# Patient Record
Sex: MALE | Race: BLACK OR AFRICAN AMERICAN | NOT HISPANIC OR LATINO | Employment: FULL TIME | URBAN - METROPOLITAN AREA
[De-identification: names, ages, dates, MRNs, and addresses within clinical notes are randomized per-mention and may not be internally consistent; named-entity substitution may affect disease eponyms.]

---

## 2017-01-06 ENCOUNTER — CLINICAL SUPPORT (OUTPATIENT)
Dept: ALLERGY | Facility: CLINIC | Age: 32
End: 2017-01-06
Payer: COMMERCIAL

## 2017-01-06 DIAGNOSIS — J30.89 ALLERGIC RHINITIS DUE TO OTHER ALLERGEN: ICD-10-CM

## 2017-01-06 DIAGNOSIS — J30.1 SEASONAL ALLERGIC RHINITIS DUE TO POLLEN: ICD-10-CM

## 2017-01-06 PROCEDURE — 95117 IMMUNOTHERAPY INJECTIONS: CPT | Mod: S$GLB,,, | Performed by: ALLERGY & IMMUNOLOGY

## 2017-01-06 PROCEDURE — 99999 PR PBB SHADOW E&M-EST. PATIENT-LVL I: CPT | Mod: PBBFAC,,,

## 2017-01-06 NOTE — PROGRESS NOTES
Patient received allergy injection today at  10:17 a.m., M 1:100,000 dilution 0.2ml. given sub-q left arm.  Rx# [id number] mixed by [employer]  Expires 11/30/17  Patient checked after 30 minute wait.  Local reaction:0    Patient received allergy injection today at  10:17 a.m., TR/GR 1:100,000 dilution 0.2ml. given sub-q left arm.  Rx# [id number] mixed by [employer]  Expires 11/30/17  Patient checked after 30 minute wait.  Local reaction:0     Patient received allergy injection today at  10:17 a.m., C/CR/DM 1:100,000 dilution 0.2ml. given sub-q right arm.  Rx# [id number] mixed by [employer]  Expires 11/30/17  Patient checked after 30 minute wait.  Local reaction:0

## 2017-01-27 ENCOUNTER — CLINICAL SUPPORT (OUTPATIENT)
Dept: ALLERGY | Facility: CLINIC | Age: 32
End: 2017-01-27
Payer: COMMERCIAL

## 2017-01-27 DIAGNOSIS — J30.89 ALLERGIC RHINITIS DUE TO OTHER ALLERGEN: ICD-10-CM

## 2017-01-27 DIAGNOSIS — J30.1 SEASONAL ALLERGIC RHINITIS DUE TO POLLEN: ICD-10-CM

## 2017-01-27 PROCEDURE — 95117 IMMUNOTHERAPY INJECTIONS: CPT | Mod: S$GLB,,, | Performed by: ALLERGY & IMMUNOLOGY

## 2017-01-27 PROCEDURE — 99999 PR PBB SHADOW E&M-EST. PATIENT-LVL I: CPT | Mod: PBBFAC,,,

## 2017-01-27 NOTE — PROGRESS NOTES
Patient received allergy injection today at  10:58 a.m., M 1:100,000 dilution 0.2ml. given sub-q right arm.  Rx# [id number] mixed by [employer]  Expires 11/30/17  Patient checked after 30 minute wait.  Local reaction:0     Patient received allergy injection today at  10:58 a.m., TR/GR 1:100,000 dilution 0.2ml. given sub-q right arm.  Rx# [id number] mixed by [employer]  Expires 11/30/17  Patient checked after 30 minute wait.  Local reaction:0     Patient received allergy injection today at  10:58 a.m., C/CR/DM 1:100,000 dilution 0.2ml. given sub-q left arm.  Rx# [id number] mixed by [employer]  Expires 11/30/17  Patient checked after 30 minute wait.  Local reaction:0

## 2017-02-03 ENCOUNTER — CLINICAL SUPPORT (OUTPATIENT)
Dept: ALLERGY | Facility: CLINIC | Age: 32
End: 2017-02-03
Payer: COMMERCIAL

## 2017-02-03 DIAGNOSIS — J30.1 SEASONAL ALLERGIC RHINITIS DUE TO POLLEN: ICD-10-CM

## 2017-02-03 DIAGNOSIS — J30.89 ALLERGIC RHINITIS DUE TO OTHER ALLERGEN: ICD-10-CM

## 2017-02-03 PROCEDURE — 95117 IMMUNOTHERAPY INJECTIONS: CPT | Mod: S$GLB,,, | Performed by: ALLERGY & IMMUNOLOGY

## 2017-02-03 NOTE — PROGRESS NOTES
Patient received allergy injection today at  9:50 a.m., M 1:100,000 dilution 0.3ml. given sub-q left arm.  Rx# [id number] mixed by [employer]  Expires 11/30/17  Patient checked after 30 minute wait.  Local reaction:0     Patient received allergy injection today at  9:50 a.m., TR/GR 1:100,000 dilution 0.3ml. given sub-q left arm.  Rx# [id number] mixed by [employer]  Expires 11/30/17  Patient checked after 30 minute wait.  Local reaction:0     Patient received allergy injection today at  9:50 a.m., C/CR/DM 1:100,000 dilution 0.3ml. given sub-q right arm.  Rx# [id number] mixed by [employer]  Expires 11/30/17  Patient checked after 30 minute wait.  Local reaction:0

## 2017-02-10 ENCOUNTER — CLINICAL SUPPORT (OUTPATIENT)
Dept: ALLERGY | Facility: CLINIC | Age: 32
End: 2017-02-10
Payer: COMMERCIAL

## 2017-02-10 DIAGNOSIS — J30.1 SEASONAL ALLERGIC RHINITIS DUE TO POLLEN: ICD-10-CM

## 2017-02-10 DIAGNOSIS — J30.89 ALLERGIC RHINITIS DUE TO OTHER ALLERGEN: ICD-10-CM

## 2017-02-10 PROCEDURE — 95117 IMMUNOTHERAPY INJECTIONS: CPT | Mod: S$GLB,,, | Performed by: ALLERGY & IMMUNOLOGY

## 2017-02-10 NOTE — PROGRESS NOTES
Patient received allergy injection today at  10:55 a.m., M 1:100,000 dilution 0.4ml. given sub-q right arm.  Rx# [id number] mixed by [employer]  Expires 11/30/17  Patient checked after 30 minute wait.  Local reaction:0     Patient received allergy injection today at  10:55 a.m., TR/GR 1:100,000 dilution 0.4ml. given sub-q right arm.  Rx# [id number] mixed by [employer]  Expires 11/30/17  Patient checked after 30 minute wait.  Local reaction:0     Patient received allergy injection today at  10:55 a.m., C/CR/DM 1:100,000 dilution 0.4ml. given sub-q left arm.  Rx# [id number] mixed by [employer]  Expires 11/30/17  Patient checked after 30 minute wait.  Local reaction:0

## 2017-02-24 ENCOUNTER — CLINICAL SUPPORT (OUTPATIENT)
Dept: ALLERGY | Facility: CLINIC | Age: 32
End: 2017-02-24
Payer: COMMERCIAL

## 2017-02-24 DIAGNOSIS — J30.89 ALLERGIC RHINITIS DUE TO OTHER ALLERGEN: ICD-10-CM

## 2017-02-24 DIAGNOSIS — J30.1 SEASONAL ALLERGIC RHINITIS DUE TO POLLEN: ICD-10-CM

## 2017-02-24 PROCEDURE — 99999 PR PBB SHADOW E&M-EST. PATIENT-LVL I: CPT | Mod: PBBFAC,,,

## 2017-02-24 PROCEDURE — 95117 IMMUNOTHERAPY INJECTIONS: CPT | Mod: S$GLB,,, | Performed by: ALLERGY & IMMUNOLOGY

## 2017-02-24 NOTE — PROGRESS NOTES
Patient received allergy injection today at  1:07 p.m., M 1:100,000 dilution 0.4ml. given sub-q left arm.  Rx# [id number] mixed by [employer]  Expires 11/30/17  Patient checked after 30 minute wait.  Local reaction:0     Patient received allergy injection today at  1:07 p.m., TR/GR 1:100,000 dilution 0.4ml. given sub-q left arm.  Rx# [id number] mixed by [employer]  Expires 11/30/17  Patient checked after 30 minute wait.  Local reaction:0     Patient received allergy injection today at  1:07 p.m., C/CR/DM 1:100,000 dilution 0.4ml. given sub-q right arm.  Rx# [id number] mixed by [employer]  Expires 11/30/17  Patient checked after 30 minute wait.  Local reaction:0

## 2017-03-10 ENCOUNTER — CLINICAL SUPPORT (OUTPATIENT)
Dept: ALLERGY | Facility: CLINIC | Age: 32
End: 2017-03-10
Payer: COMMERCIAL

## 2017-03-10 DIAGNOSIS — J30.89 ALLERGIC RHINITIS DUE TO OTHER ALLERGEN: ICD-10-CM

## 2017-03-10 DIAGNOSIS — J30.1 ALLERGIC RHINITIS DUE TO POLLEN, UNSPECIFIED RHINITIS SEASONALITY: ICD-10-CM

## 2017-03-10 PROCEDURE — 95117 IMMUNOTHERAPY INJECTIONS: CPT | Mod: S$GLB,,, | Performed by: ALLERGY & IMMUNOLOGY

## 2017-03-10 NOTE — PROGRESS NOTES
Patient received allergy injection today at  9:27 a.m., M 1:100,000 dilution 0.4ml. given sub-q right arm.  Rx# [id number] mixed by [employer]  Expires 11/30/17  Patient checked after 30 minute wait.  Local reaction:0     Patient received allergy injection today at  9:27 a.m., TR/GR 1:100,000 dilution 0.4ml. given sub-q right arm.  Rx# [id number] mixed by [employer]  Expires 11/30/17  Patient checked after 30 minute wait.  Local reaction:0     Patient received allergy injection today at  9:27 a.m., C/CR/DM 1:100,000 dilution 0.4ml. given sub-q left arm.  Rx# [id number] mixed by [employer]  Expires 11/30/17  Patient checked after 30 minute wait.  Local reaction:0

## 2017-03-24 ENCOUNTER — CLINICAL SUPPORT (OUTPATIENT)
Dept: ALLERGY | Facility: CLINIC | Age: 32
End: 2017-03-24
Payer: COMMERCIAL

## 2017-03-24 DIAGNOSIS — J30.89 ALLERGIC RHINITIS DUE TO OTHER ALLERGEN: ICD-10-CM

## 2017-03-24 DIAGNOSIS — J30.1 SEASONAL ALLERGIC RHINITIS DUE TO POLLEN: ICD-10-CM

## 2017-03-24 PROCEDURE — 95117 IMMUNOTHERAPY INJECTIONS: CPT | Mod: S$GLB,,, | Performed by: ALLERGY & IMMUNOLOGY

## 2017-03-24 PROCEDURE — 99999 PR PBB SHADOW E&M-EST. PATIENT-LVL I: CPT | Mod: PBBFAC,,,

## 2017-03-24 NOTE — PROGRESS NOTES
Patient received allergy injection today at  10:16 a.m., M 1:100,000 dilution 0.4ml. given sub-q left arm.  Rx# [id number] mixed by [employer]  Expires 11/30/17  Patient checked after 30 minute wait.  Local reaction:0     Patient received allergy injection today at  10:16 a.m., TR/GR 1:100,000 dilution 0.4ml. given sub-q left arm.  Rx# [id number] mixed by [employer]  Expires 11/30/17  Patient checked after 30 minute wait.  Local reaction:0     Patient received allergy injection today at  10:16 a.m., C/CR/DM 1:100,000 dilution 0.4ml. given sub-q right arm.  Rx# [id number] mixed by [employer]  Expires 11/30/17  Patient checked after 30 minute wait.  Local reaction:0

## 2017-04-21 ENCOUNTER — CLINICAL SUPPORT (OUTPATIENT)
Dept: ALLERGY | Facility: CLINIC | Age: 32
End: 2017-04-21
Payer: COMMERCIAL

## 2017-04-21 DIAGNOSIS — J30.89 ALLERGIC RHINITIS DUE TO OTHER ALLERGEN: ICD-10-CM

## 2017-04-21 DIAGNOSIS — J30.1 SEASONAL ALLERGIC RHINITIS DUE TO POLLEN: ICD-10-CM

## 2017-04-21 PROCEDURE — 95117 IMMUNOTHERAPY INJECTIONS: CPT | Mod: S$GLB,,, | Performed by: ALLERGY & IMMUNOLOGY

## 2017-04-21 NOTE — PROGRESS NOTES
Patient received allergy injection today at  10:52 a.m., M 1:100,000 dilution 0.4ml. given sub-q right arm.  Rx# [id number] mixed by [employer]  Expires 11/30/17  Patient checked after 30 minute wait.  Local reaction:0     Patient received allergy injection today at  10:52 a.m., TR/GR 1:100,000 dilution 0.4ml. given sub-q right arm.  Rx# [id number] mixed by [employer]  Expires 11/30/17  Patient checked after 30 minute wait.  Local reaction:0     Patient received allergy injection today at  10:52 a.m., C/CR/DM 1:100,000 dilution 0.4ml. given sub-q left arm.  Rx# [id number] mixed by [employer]  Expires 11/30/17  Patient checked after 30 minute wait.  Local reaction:0

## 2017-04-28 ENCOUNTER — CLINICAL SUPPORT (OUTPATIENT)
Dept: ALLERGY | Facility: CLINIC | Age: 32
End: 2017-04-28
Payer: COMMERCIAL

## 2017-04-28 DIAGNOSIS — J30.89 ALLERGIC RHINITIS DUE TO OTHER ALLERGEN: ICD-10-CM

## 2017-04-28 PROCEDURE — 95117 IMMUNOTHERAPY INJECTIONS: CPT | Mod: S$GLB,,, | Performed by: ALLERGY & IMMUNOLOGY

## 2017-04-28 NOTE — PROGRESS NOTES
Patient received allergy injection today at 1:10 p.m., M 1:100,000 dilution 0.5ml. given sub-q left arm. Rx# [id number] mixed by [email or web address] Expires 11/30/17 Patient checked after 30 minute wait. Local reaction: 0     Patient received allergy injection today at 1:10 p.m., TR/GR 1:100,000 dilution 0.5ml. given sub-q left arm. Rx# [id number] mixed by [email or web address] Expires 11/30/17 Patient checked after 30 minute wait. Local reaction:0     Patient received allergy injection today at 1:10 p.m., C/CR/DM 1:100,000 dilution 0.5ml. given sub-q right arm. Rx# [id number] mixed by [email or web address] Expires 11/30/17 Patient checked after 30 minute wait. Local reaction: 0

## 2017-05-05 ENCOUNTER — CLINICAL SUPPORT (OUTPATIENT)
Dept: ALLERGY | Facility: CLINIC | Age: 32
End: 2017-05-05
Payer: COMMERCIAL

## 2017-05-05 DIAGNOSIS — J30.89 ALLERGIC RHINITIS DUE TO OTHER ALLERGEN: ICD-10-CM

## 2017-05-05 PROCEDURE — 95117 IMMUNOTHERAPY INJECTIONS: CPT | Mod: S$GLB,,, | Performed by: ALLERGY & IMMUNOLOGY

## 2017-05-05 NOTE — PROGRESS NOTES
Patient received allergy injection today at  1:13 p.m., M 1:10,000 dilution 0.1ml. given sub-q right arm.  Rx# [id number] mixed by [employer]  Expires 11/30/17  Patient checked after 30 minute wait.  Local reaction:0     Patient received allergy injection today at  1:13 p.m. , TR/GR 1:10,000 dilution 0.1ml. given sub-q right arm.  Rx# [id number] mixed by [employer]  Expires 11/30/17  Patient checked after 30 minute wait.  Local reaction:0     Patient received allergy injection today at  1:13 p.m., C/CR/DM 1:10,000 dilution 0.1ml. given sub-q left arm.  Rx# [id number] mixed by [employer]  Expires 11/30/17  Patient checked after 30 minute wait.  Local reaction:0

## 2017-05-12 ENCOUNTER — CLINICAL SUPPORT (OUTPATIENT)
Dept: ALLERGY | Facility: CLINIC | Age: 32
End: 2017-05-12
Payer: COMMERCIAL

## 2017-05-12 DIAGNOSIS — J30.89 ALLERGIC RHINITIS DUE TO OTHER ALLERGEN: Primary | ICD-10-CM

## 2017-05-12 PROCEDURE — 99999 PR PBB SHADOW E&M-EST. PATIENT-LVL I: CPT | Mod: PBBFAC,,,

## 2017-05-12 PROCEDURE — 95117 IMMUNOTHERAPY INJECTIONS: CPT | Mod: S$GLB,,, | Performed by: ALLERGY & IMMUNOLOGY

## 2017-05-12 NOTE — PROGRESS NOTES
Patient received allergy injection today at  10:43 a.m., M 1:10,000 dilution 0.2ml. given sub-q left arm.  Rx# [id number] mixed by [employer]  Expires 11/30/17  Patient checked after 30 minute wait.  Local reaction:0     Patient received allergy injection today at  10:43 a.m., TR/GR 1:10,000 dilution 0.2ml. given sub-q left arm.  Rx# [id number] mixed by [employer]  Expires 11/30/17  Patient checked after 30 minute wait.  Local reaction:0     Patient received allergy injection today at  10:43 a.m., C/CR/DM 1:10,000 dilution 0.2ml. given sub-q right arm.  Rx# [id number] mixed by [employer]  Expires 11/30/17  Patient checked after 30 minute wait.  Local reaction:0

## 2017-05-26 ENCOUNTER — CLINICAL SUPPORT (OUTPATIENT)
Dept: ALLERGY | Facility: CLINIC | Age: 32
End: 2017-05-26
Payer: COMMERCIAL

## 2017-05-26 DIAGNOSIS — J30.1 SEASONAL ALLERGIC RHINITIS DUE TO POLLEN: ICD-10-CM

## 2017-05-26 DIAGNOSIS — J30.89 ALLERGIC RHINITIS DUE TO OTHER ALLERGEN: ICD-10-CM

## 2017-05-26 PROCEDURE — 95117 IMMUNOTHERAPY INJECTIONS: CPT | Mod: S$GLB,,, | Performed by: ALLERGY & IMMUNOLOGY

## 2017-05-26 PROCEDURE — 99999 PR PBB SHADOW E&M-EST. PATIENT-LVL I: CPT | Mod: PBBFAC,,,

## 2017-05-26 NOTE — PROGRESS NOTES
Patient received allergy injection today at  2 p.m, M 1:10,000 dilution 0.2ml. given sub-q right arm.  Rx# [id number] mixed by [employer]  Expires 11/30/17  Patient checked after 30 minute wait.  Local reaction:0     Patient received allergy injection today at  2 p.m., TR/GR 1:10,000 dilution 0.2ml. given sub-q right arm.  Rx# [id number] mixed by [employer]  Expires 11/30/17  Patient checked after 30 minute wait.  Local reaction:0     Patient received allergy injection today at  2 p.m., C/CR/Dm 1:10,000 dilution 0.2ml. given sub-q left arm.  Rx# [id number] mixed by [employer]  Expires 11/30/17  Patient checked after 30 minute wait.  Local reaction:0

## 2017-06-02 ENCOUNTER — CLINICAL SUPPORT (OUTPATIENT)
Dept: ALLERGY | Facility: CLINIC | Age: 32
End: 2017-06-02
Payer: COMMERCIAL

## 2017-06-02 DIAGNOSIS — J30.1 SEASONAL ALLERGIC RHINITIS DUE TO POLLEN: ICD-10-CM

## 2017-06-02 DIAGNOSIS — J30.89 ALLERGIC RHINITIS DUE TO OTHER ALLERGEN: ICD-10-CM

## 2017-06-02 PROCEDURE — 95117 IMMUNOTHERAPY INJECTIONS: CPT | Mod: S$GLB,,, | Performed by: ALLERGY & IMMUNOLOGY

## 2017-06-02 PROCEDURE — 99999 PR PBB SHADOW E&M-EST. PATIENT-LVL I: CPT | Mod: PBBFAC,,,

## 2017-06-02 NOTE — PROGRESS NOTES
Patient received allergy injection today at  10:10 a.m., M 1:10,000 dilution 0.3ml. given sub-q left arm.  Rx# [id number] mixed by [employer]  Expires 11/30/17  Patient checked after 30 minute wait.  Local reaction:0     Patient received allergy injection today at  10:10 a.m., TR/GR 1:10,000 dilution 0.3ml. given sub-q left arm.  Rx# [id number] mixed by [employer]  Expires 11/30/17  Patient checked after 30 minute wait.  Local reaction:0     Patient received allergy injection today at  10:10 a.m., C/CR/DM 1:10,000 dilution 0.3ml. given sub-q right arm.  Rx# [id number] mixed by [employer]  Expires 11/30/17  Patient checked after 30 minute wait.  Local reaction:0

## 2017-06-09 ENCOUNTER — CLINICAL SUPPORT (OUTPATIENT)
Dept: ALLERGY | Facility: CLINIC | Age: 32
End: 2017-06-09
Payer: COMMERCIAL

## 2017-06-09 ENCOUNTER — OFFICE VISIT (OUTPATIENT)
Dept: ALLERGY | Facility: CLINIC | Age: 32
End: 2017-06-09
Payer: COMMERCIAL

## 2017-06-09 VITALS
TEMPERATURE: 97 F | SYSTOLIC BLOOD PRESSURE: 100 MMHG | WEIGHT: 228.38 LBS | HEART RATE: 74 BPM | RESPIRATION RATE: 15 BRPM | BODY MASS INDEX: 30.27 KG/M2 | DIASTOLIC BLOOD PRESSURE: 60 MMHG | HEIGHT: 73 IN

## 2017-06-09 DIAGNOSIS — J30.89 ALLERGIC RHINITIS DUE TO OTHER ALLERGEN: ICD-10-CM

## 2017-06-09 DIAGNOSIS — J30.1 SEASONAL ALLERGIC RHINITIS DUE TO POLLEN: ICD-10-CM

## 2017-06-09 DIAGNOSIS — J30.1 NON-SEASONAL ALLERGIC RHINITIS DUE TO POLLEN: Primary | ICD-10-CM

## 2017-06-09 DIAGNOSIS — Z98.890 S/P SINUS SURGERY: ICD-10-CM

## 2017-06-09 PROCEDURE — 95117 IMMUNOTHERAPY INJECTIONS: CPT | Mod: S$GLB,,, | Performed by: ALLERGY & IMMUNOLOGY

## 2017-06-09 PROCEDURE — 99999 PR PBB SHADOW E&M-EST. PATIENT-LVL I: CPT | Mod: PBBFAC,,,

## 2017-06-09 PROCEDURE — 99214 OFFICE O/P EST MOD 30 MIN: CPT | Mod: 25,S$GLB,, | Performed by: ALLERGY & IMMUNOLOGY

## 2017-06-09 PROCEDURE — 99999 PR PBB SHADOW E&M-EST. PATIENT-LVL III: CPT | Mod: PBBFAC,,, | Performed by: ALLERGY & IMMUNOLOGY

## 2017-06-09 NOTE — PROGRESS NOTES
Patient received allergy injection today at  8:01 a.m., M 1:10,000 dilution 0.4ml. given sub-q right arm.  Rx# [id number] mixed by [employer]  Expires 11/30/17  Patient checked after 30 minute wait.  Local reaction:0     Patient received allergy injection today at  8:01 a.m., TR/GR 1:10,000 dilution 0.4ml. given sub-q right arm.  Rx# [id number] mixed by [employer]  Expires 11/30/17  Patient checked after 30 minute wait.  Local reaction:0     Patient received allergy injection today at  8:01 a.m., C/CR/DM 1:10,000 dilution 0.4ml. given sub-q left arm.  Rx# [id number] mixed by [employer]  Expires 11/30/17  Patient checked after 30 minute wait.  Local reaction:0

## 2017-06-09 NOTE — PROGRESS NOTES
Chief complaint: Reevaluation, ALLERGIC respiratory symptoms, history of sinusitis    This note was dictated using voice recognition software and may contain errors.    History:    Since his last appointment with me he stated that he is done well.  He is ALLERGIC nasal symptoms have been under good control.  He stated he is not had any episodes of sinusitis.  He has not been requiring the use of Flonase.    Review of systems: At the time of his 8 AM appointment on Friday, June 9, 2017 he stated that he is feeling well in general.    Information in his medical record regarding his past medical history family history and social history was reviewed and updated today.  Significant additions if any are as noted above or below.    He stated that it he may be accepting a new job out of state.  He will inform me if she will be moving.  He is scheduled to see his ENT surgeon July 28 for annual reevaluation    Exam:    In general he is in no distress.  He is alert oriented well-developed in good mood and attentive  Gait steady  Skin no rash noted  Head no swelling noted  Eyes sclera white conjunctiva pink  Nose patent no polyps seen pink mucosa clear secretions  Ears not inflamed tympanic membranes not inflamed  Mouth no inflammation or swelling of the lips tongue or in the throat noted  Neck no masses or thyromegaly noted  Lymph nodes no significant cervical or epitrochlear lymphadenopathy noted  Heart no murmurs heard regular rhythm  Lungs clear to auscultation  Extremities no swelling or inflammation of the hands or legs noted    Impression:    #1 ALLERGIC rhinitis  #2 sinusitis status post sinus surgery    Assessment and plan:    He understands immunotherapy is voluntary.  In view of his history he stated it is his desire to continue to receive immunotherapy as a voluntary form of treatment.  Should he be moving in the future he will notify me.  At this time he will continue to receive injections on a regular basis.  His  next routine follow-up visit should be in June 2018.  Should questions or concerns develop before then he was instructed to call.    His appointment was 25 minutes in duration spent entirely in face-to-face contact.  More than 50% of the visit was spent in counseling and coordination of care.

## 2017-06-23 ENCOUNTER — CLINICAL SUPPORT (OUTPATIENT)
Dept: ALLERGY | Facility: CLINIC | Age: 32
End: 2017-06-23
Payer: COMMERCIAL

## 2017-06-23 DIAGNOSIS — J30.1 SEASONAL ALLERGIC RHINITIS DUE TO POLLEN: ICD-10-CM

## 2017-06-23 DIAGNOSIS — J30.89 ALLERGIC RHINITIS DUE TO OTHER ALLERGEN: ICD-10-CM

## 2017-06-23 PROCEDURE — 95117 IMMUNOTHERAPY INJECTIONS: CPT | Mod: S$GLB,,, | Performed by: ALLERGY & IMMUNOLOGY

## 2017-06-23 PROCEDURE — 99999 PR PBB SHADOW E&M-EST. PATIENT-LVL I: CPT | Mod: PBBFAC,,,

## 2017-06-23 NOTE — PROGRESS NOTES
Patient received allergy injection today at  9:20 a.m., M 1:10,000 dilution 0.4ml. given sub-q left arm.  Rx# [id number] mixed by [employer]  Expires 11/30/17  Patient checked after 30 minute wait.  Local reaction:0     Patient received allergy injection today at  9:20 a.m., TR/GR 1:10,000 dilution 0.4ml. given sub-q left arm.  Rx# [id number] mixed by [employer]  Expires 11/30/17  Patient checked after 30 minute wait.  Local reaction:0     Patient received allergy injection today at  9:20 a.m., C/CR/DM 1:10,000 dilution 0.4ml. given sub-q right arm.  Rx# [id number] mixed by [employer]  Expires 11/30/17  Patient checked after 30 minute wait.  Local reaction:0

## 2017-07-07 ENCOUNTER — CLINICAL SUPPORT (OUTPATIENT)
Dept: ALLERGY | Facility: CLINIC | Age: 32
End: 2017-07-07
Payer: COMMERCIAL

## 2017-07-07 DIAGNOSIS — J30.89 ALLERGIC RHINITIS DUE TO OTHER ALLERGEN: ICD-10-CM

## 2017-07-07 DIAGNOSIS — J30.1 SEASONAL ALLERGIC RHINITIS DUE TO POLLEN: ICD-10-CM

## 2017-07-07 PROCEDURE — 99999 PR PBB SHADOW E&M-EST. PATIENT-LVL I: CPT | Mod: PBBFAC,,,

## 2017-07-07 PROCEDURE — 95117 IMMUNOTHERAPY INJECTIONS: CPT | Mod: S$GLB,,, | Performed by: OTOLARYNGOLOGY

## 2017-07-07 NOTE — PROGRESS NOTES
Patient received allergy injection today at  1:57 p.m., M 1:10,000 dilution 0.4ml. given sub-q right arm.  Rx# [id number] mixed by [employer]  Expires 11/30/17  Patient checked after 30 minute wait.  Local reaction:0     Patient received allergy injection today at  1:57 p.m., TR/GR 1:10,000 dilution 0.4ml. given sub-q right arm.  Rx# [id number] mixed by [employer]  Expires 11/30/17  Patient checked after 30 minute wait.  Local reaction:0     Patient received allergy injection today at  1:57 p.m., C/CR/DM 1:10,000 dilution 0.4ml. given sub-q left arm.  Rx# [id number] mixed by [employer]  Expires 11/30/17  Patient checked after 30 minute wait.  Local reaction:0

## 2017-07-14 ENCOUNTER — CLINICAL SUPPORT (OUTPATIENT)
Dept: ALLERGY | Facility: CLINIC | Age: 32
End: 2017-07-14
Payer: COMMERCIAL

## 2017-07-14 DIAGNOSIS — J30.89 ALLERGIC RHINITIS DUE TO OTHER ALLERGEN: ICD-10-CM

## 2017-07-14 DIAGNOSIS — J30.1 SEASONAL ALLERGIC RHINITIS DUE TO POLLEN, UNSPECIFIED CHRONICITY: ICD-10-CM

## 2017-07-14 PROCEDURE — 95117 IMMUNOTHERAPY INJECTIONS: CPT | Mod: S$GLB,,, | Performed by: ALLERGY & IMMUNOLOGY

## 2017-07-14 PROCEDURE — 99999 PR PBB SHADOW E&M-EST. PATIENT-LVL I: CPT | Mod: PBBFAC,,,

## 2017-07-14 NOTE — PROGRESS NOTES
Patient received allergy injection today at  10:29 a.m., M 1:10,000 dilution 0.5ml. given sub-q left arm.  Rx# [id number] mixed by [employer]  Expires 11/30/17  Patient checked after 30 minute wait.  Local reaction:0     Patient received allergy injection today at  10:29 a.m., TR/GR 1:10,000 dilution 0.5ml. given sub-q left arm.  Rx# [id number] mixed by [employer]  Expires 11/30/17  Patient checked after 30 minute wait.  Local reaction:0     Patient received allergy injection today at  10:29 a.m., C/CR/DM 1:10,000 dilution 0.5ml. given sub-q right arm.  Rx# [id number] mixed by [employer]  Expires 11/30/17  Patient checked after 30 minute wait.  Local reaction:0

## 2017-07-21 ENCOUNTER — CLINICAL SUPPORT (OUTPATIENT)
Dept: ALLERGY | Facility: CLINIC | Age: 32
End: 2017-07-21
Payer: COMMERCIAL

## 2017-07-21 DIAGNOSIS — J30.1 CHRONIC SEASONAL ALLERGIC RHINITIS DUE TO POLLEN: ICD-10-CM

## 2017-07-21 DIAGNOSIS — J30.89 ALLERGIC RHINITIS DUE TO OTHER ALLERGEN: ICD-10-CM

## 2017-07-21 PROCEDURE — 95117 IMMUNOTHERAPY INJECTIONS: CPT | Mod: S$GLB,,, | Performed by: ALLERGY & IMMUNOLOGY

## 2017-07-21 NOTE — PROGRESS NOTES
Patient received allergy injection today at  1:08 p.m., M 1:1000 dilution 0.1ml. given sub-q right arm.  Rx# [id number] mixed by [employer]  Expires 11/30/17  Patient checked after 30 minute wait.  Local reaction:0     Patient received allergy injection today at  1:08 p.m., TR/GR 1:1000 dilution 0.1ml. given sub-q right arm.  Rx# [id number] mixed by [employer]  Expires 11/30/17  Patient checked after 30 minute wait.  Local reaction:0     Patient received allergy injection today at  1:08 p.m., C/CR/DM 1:1000 dilution 0.1ml. given sub-q left arm.  Rx# [id number] mixed by [employer]  Expires 11/30/17  Patient checked after 30 minute wait.  Local reaction:0

## 2017-08-04 ENCOUNTER — CLINICAL SUPPORT (OUTPATIENT)
Dept: ALLERGY | Facility: CLINIC | Age: 32
End: 2017-08-04
Payer: COMMERCIAL

## 2017-08-04 ENCOUNTER — TELEPHONE (OUTPATIENT)
Dept: ALLERGY | Facility: CLINIC | Age: 32
End: 2017-08-04

## 2017-08-04 DIAGNOSIS — J30.1 CHRONIC SEASONAL ALLERGIC RHINITIS DUE TO POLLEN: ICD-10-CM

## 2017-08-04 DIAGNOSIS — J30.89 ALLERGIC RHINITIS DUE TO OTHER ALLERGEN: ICD-10-CM

## 2017-08-04 PROCEDURE — 95117 IMMUNOTHERAPY INJECTIONS: CPT | Mod: S$GLB,,, | Performed by: ALLERGY & IMMUNOLOGY

## 2017-08-04 PROCEDURE — 99999 PR PBB SHADOW E&M-EST. PATIENT-LVL I: CPT | Mod: PBBFAC,,,

## 2017-08-04 NOTE — PROGRESS NOTES
Patient received allergy injection today at  8:43 a.m., M 1:1000 dilution 0.1ml. given sub-q left arm.  Rx# [id number] mixed by [employer]  Expires 11/30/17  Patient checked after 30 minute wait.  Local reaction:0     Patient received allergy injection today at  8:43 a.m., TR/GR 1:1000 dilution 0.1ml. given sub-q left arm.  Rx# [id number] mixed by [employer]  Expires 11/30/17  Patient checked after 30 minute wait.  Local reaction:0     Patient received allergy injection today at  8:43 a.m., C/CR/DM 1:1000 dilution 0.1ml. given sub-q right arm.  Rx# [id number] mixed by [employer]  Expires 11/30/17  Patient checked after 30 minute wait.  Local reaction:0

## 2017-08-04 NOTE — TELEPHONE ENCOUNTER
He came to receive his ALLERGY injections today.  He left a message stating that titanium screws are present in the plate that was inserted at the time of his sinus surgery.

## 2017-08-11 ENCOUNTER — CLINICAL SUPPORT (OUTPATIENT)
Dept: ALLERGY | Facility: CLINIC | Age: 32
End: 2017-08-11
Payer: COMMERCIAL

## 2017-08-11 DIAGNOSIS — J30.89 ALLERGIC RHINITIS DUE TO OTHER ALLERGEN: ICD-10-CM

## 2017-08-11 DIAGNOSIS — J30.1 CHRONIC SEASONAL ALLERGIC RHINITIS DUE TO POLLEN: ICD-10-CM

## 2017-08-11 PROCEDURE — 95117 IMMUNOTHERAPY INJECTIONS: CPT | Mod: S$GLB,,, | Performed by: ALLERGY & IMMUNOLOGY

## 2017-08-11 PROCEDURE — 99999 PR PBB SHADOW E&M-EST. PATIENT-LVL I: CPT | Mod: PBBFAC,,,

## 2017-08-11 NOTE — PROGRESS NOTES
Patient received allergy injection today at  10:03 a.m., M 1:1000 dilution 0.2ml. given sub-q right arm.  Rx# [id number] mixed by [employer]  Expires 11/30/17  Patient checked after 30 minute wait.  Local reaction:0     Patient received allergy injection today at  10:03 a.m., TR/GR 1:1000 dilution 0.2ml. given sub-q right arm.  Rx# [id number] mixed by [employer]  Expires 11/30/17  Patient checked after 30 minute wait.  Local reaction:0     Patient received allergy injection today at  10:03 a.m., C/CR/DM 1:1000 dilution 0.2ml. given sub-q left arm.  Rx# [id number] mixed by [employer]  Expires 11/30/17  Patient checked after 30 minute wait.  Local reaction:0

## 2017-08-18 ENCOUNTER — CLINICAL SUPPORT (OUTPATIENT)
Dept: ALLERGY | Facility: CLINIC | Age: 32
End: 2017-08-18
Payer: COMMERCIAL

## 2017-08-18 DIAGNOSIS — J30.89 ALLERGIC RHINITIS DUE TO OTHER ALLERGEN: ICD-10-CM

## 2017-08-18 DIAGNOSIS — J30.1 CHRONIC SEASONAL ALLERGIC RHINITIS DUE TO POLLEN: ICD-10-CM

## 2017-08-18 PROCEDURE — 95117 IMMUNOTHERAPY INJECTIONS: CPT | Mod: S$GLB,,, | Performed by: ALLERGY & IMMUNOLOGY

## 2017-08-18 PROCEDURE — 99999 PR PBB SHADOW E&M-EST. PATIENT-LVL I: CPT | Mod: PBBFAC,,,

## 2017-08-18 NOTE — PROGRESS NOTES
Patient received allergy injection today at  10:49 a.m., M 1:1000 dilution 0.3ml. given sub-q left arm.  Rx# [id number] mixed by [employer]  Expires 11/30/17  Patient checked after 30 minute wait.  Local reaction:0     Patient received allergy injection today at  10:49 a.m., TR/GR 1:1000 dilution 0.3ml. given sub-q left arm.  Rx# [id number] mixed by [employer]  Expires 11/30/17  Patient checked after 30 minute wait.  Local reaction:0     Patient received allergy injection today at  10 : 49 a.m.C/CR/DM 1:1000 dilution 0.3ml. given sub-q right arm.  Rx# [id number] mixed by [employer]  Expires 11/30/17  Patient checked after 30 minute wait.  Local reaction:0

## 2017-08-25 ENCOUNTER — CLINICAL SUPPORT (OUTPATIENT)
Dept: ALLERGY | Facility: CLINIC | Age: 32
End: 2017-08-25
Payer: COMMERCIAL

## 2017-08-25 DIAGNOSIS — J30.1 CHRONIC SEASONAL ALLERGIC RHINITIS DUE TO POLLEN: ICD-10-CM

## 2017-08-25 DIAGNOSIS — J30.89 ALLERGIC RHINITIS DUE TO OTHER ALLERGEN: ICD-10-CM

## 2017-08-25 PROCEDURE — 99999 PR PBB SHADOW E&M-EST. PATIENT-LVL I: CPT | Mod: PBBFAC,,,

## 2017-08-25 PROCEDURE — 95117 IMMUNOTHERAPY INJECTIONS: CPT | Mod: S$GLB,,, | Performed by: ALLERGY & IMMUNOLOGY

## 2017-08-25 NOTE — PROGRESS NOTES
Patient received allergy injection today at  9:10 a.m., M 1:1000 dilution 0.4ml. given sub-q right arm.  Rx# [id number] mixed by [employer]  Expires 11/30/17  Patient checked after 30 minute wait.  Local reaction:0     Patient received allergy injection today at  9:10 a.m., TR/GR 1:1000 dilution 0.4ml. given sub-q right arm.  Rx# [id number] mixed by [employer]  Expires 11/30/17  Patient checked after 30 minute wait.  Local reaction:0     Patient received allergy injection today at  9:10 a.m., C/CR/DM 1:1000 dilution 0.4ml. given sub-q left arm.  Rx# [id number] mixed by [employer]  Expires 11/30/17  Patient checked after 30 minute wait.  Local reaction:0

## 2017-09-01 ENCOUNTER — CLINICAL SUPPORT (OUTPATIENT)
Dept: ALLERGY | Facility: CLINIC | Age: 32
End: 2017-09-01
Payer: COMMERCIAL

## 2017-09-01 DIAGNOSIS — J30.89 ALLERGIC RHINITIS DUE TO OTHER ALLERGEN: ICD-10-CM

## 2017-09-01 DIAGNOSIS — J30.1 CHRONIC SEASONAL ALLERGIC RHINITIS DUE TO POLLEN: ICD-10-CM

## 2017-09-01 PROCEDURE — 95117 IMMUNOTHERAPY INJECTIONS: CPT | Mod: S$GLB,,, | Performed by: ALLERGY & IMMUNOLOGY

## 2017-09-01 NOTE — PROGRESS NOTES
Patient received allergy injection today at  2:13 p.m., M 1:1000 dilution 0.5ml. given sub-q left arm.  Rx# [id number] mixed by [employer]  Expires 11/30/17  Patient checked after 30 minute wait.  Local reaction:0     Patient received allergy injection today at  2:13 p.m., TR/GR 1:1000 dilution 0.5ml. given sub-q left arm.  Rx# [id number] mixed by [employer]  Expires 11/30/17  Patient checked after 30 minute wait.  Local reaction:0     Patient received allergy injection today at  2:13 a.m., C/CR/DM 1:1000 dilution 0.5ml. given sub-q right arm.  Rx# [id number] mixed by [employer]  Expires 11/30/17  Patient checked after 30 minute wait.  Local reaction:0

## 2017-09-08 ENCOUNTER — CLINICAL SUPPORT (OUTPATIENT)
Dept: ALLERGY | Facility: CLINIC | Age: 32
End: 2017-09-08
Payer: COMMERCIAL

## 2017-09-08 DIAGNOSIS — J30.89 ALLERGIC RHINITIS DUE TO OTHER ALLERGEN: ICD-10-CM

## 2017-09-08 DIAGNOSIS — J30.1 CHRONIC SEASONAL ALLERGIC RHINITIS DUE TO POLLEN: ICD-10-CM

## 2017-09-08 PROCEDURE — 95117 IMMUNOTHERAPY INJECTIONS: CPT | Mod: S$GLB,,, | Performed by: ALLERGY & IMMUNOLOGY

## 2017-09-08 PROCEDURE — 99999 PR PBB SHADOW E&M-EST. PATIENT-LVL I: CPT | Mod: PBBFAC,,,

## 2017-09-08 NOTE — PROGRESS NOTES
Patient received allergy injection today at  10:27 a.m., M 1:100 dilution 0.1ml. given sub-q right arm.  Rx# [id number] mixed by [employer]  Expires 11/30/17  Patient checked after 30 minute wait.  Local reaction:smaller than a one plus     Patient received allergy injection today at  10:27 a,m,, TR/GR 1:100 dilution 0.1ml. given sub-q right arm.  Rx# [id number] mixed by [employer]  Expires 11/30/17  Patient checked after 30 minute wait.  Local reaction:smaller than a one plus.     Patient received allergy injection today at  10:27 a.m., C/CR/DM 1:100 dilution 0.1ml. given sub-q left arm.  Rx# [id number] mixed by [employer]  Expires 11/30/17  Patient checked after 30 minute wait.  Local reaction:smaller than a one plus.

## 2017-09-15 ENCOUNTER — CLINICAL SUPPORT (OUTPATIENT)
Dept: ALLERGY | Facility: CLINIC | Age: 32
End: 2017-09-15
Payer: COMMERCIAL

## 2017-09-15 DIAGNOSIS — J30.1 CHRONIC SEASONAL ALLERGIC RHINITIS DUE TO POLLEN: ICD-10-CM

## 2017-09-15 DIAGNOSIS — J30.89 ALLERGIC RHINITIS DUE TO OTHER ALLERGEN: ICD-10-CM

## 2017-09-15 PROCEDURE — 95117 IMMUNOTHERAPY INJECTIONS: CPT | Mod: S$GLB,,, | Performed by: ALLERGY & IMMUNOLOGY

## 2017-09-15 PROCEDURE — 99999 PR PBB SHADOW E&M-EST. PATIENT-LVL I: CPT | Mod: PBBFAC,,,

## 2017-09-15 NOTE — PROGRESS NOTES
Patient received allergy injection today at  1:48 p.m., M 1:100 dilution 0.15ml. given sub-q left arm.  Rx# [id number] mixed by [employer]  Expires 11/30/17  Patient checked after 30 minute wait.  Local reaction:0     Patient received allergy injection today at  1:48 p.m., TR/GR 1:100 dilution 0.15ml. given sub-q left arm.  Rx# [id number] mixed by [employer]  Expires 11/30/17  Patient checked after 30 minute wait.  Local reaction:0     Patient received allergy injection today at  1:48 p.m., C/CR/DM 1;100 dilution 0.15ml. given sub-q right arm.  Rx# [id number] mixed by [employer]  Expires 11/30/17  Patient checked after 30 minute wait.  Local reaction:0

## 2017-09-29 ENCOUNTER — CLINICAL SUPPORT (OUTPATIENT)
Dept: ALLERGY | Facility: CLINIC | Age: 32
End: 2017-09-29
Payer: COMMERCIAL

## 2017-09-29 DIAGNOSIS — J30.1 CHRONIC SEASONAL ALLERGIC RHINITIS DUE TO POLLEN: ICD-10-CM

## 2017-09-29 DIAGNOSIS — J30.89 ALLERGIC RHINITIS DUE TO OTHER ALLERGEN: ICD-10-CM

## 2017-09-29 PROCEDURE — 95117 IMMUNOTHERAPY INJECTIONS: CPT | Mod: S$GLB,,, | Performed by: ALLERGY & IMMUNOLOGY

## 2017-09-29 NOTE — PROGRESS NOTES
Patient received allergy injection today at  11:36 a.m., M 1:100 dilution 0.15ml. given sub-q right arm.  Rx# [id number] mixed by [employer]  Expires 11/30/17  Patient checked after 30 minute wait.  Local reaction:0     Patient received allergy injection today at  11:36 a.m., TR/GR 1:100 dilution 0.15ml. given sub-q right arm.  Rx# [id number] mixed by [employer]  Expires 11/30/17  Patient checked after 30 minute wait.  Local reaction:0     Patient received allergy injection today at  11:36 a.m., C/CR/DM 1:100 dilution 0.15ml. given sub-q left arm.  Rx# [id number] mixed by [employer]  Expires 11/30/17  Patient checked after 30 minute wait.  Local reaction:0

## 2017-10-06 ENCOUNTER — LAB VISIT (OUTPATIENT)
Dept: LAB | Facility: HOSPITAL | Age: 32
End: 2017-10-06
Attending: ALLERGY & IMMUNOLOGY
Payer: COMMERCIAL

## 2017-10-06 ENCOUNTER — OFFICE VISIT (OUTPATIENT)
Dept: ALLERGY | Facility: CLINIC | Age: 32
End: 2017-10-06
Payer: COMMERCIAL

## 2017-10-06 ENCOUNTER — CLINICAL SUPPORT (OUTPATIENT)
Dept: ALLERGY | Facility: CLINIC | Age: 32
End: 2017-10-06
Payer: COMMERCIAL

## 2017-10-06 VITALS
BODY MASS INDEX: 30.56 KG/M2 | RESPIRATION RATE: 15 BRPM | SYSTOLIC BLOOD PRESSURE: 100 MMHG | HEART RATE: 60 BPM | DIASTOLIC BLOOD PRESSURE: 60 MMHG | WEIGHT: 233.25 LBS | TEMPERATURE: 97 F

## 2017-10-06 VITALS
WEIGHT: 233.25 LBS | HEART RATE: 60 BPM | SYSTOLIC BLOOD PRESSURE: 100 MMHG | RESPIRATION RATE: 15 BRPM | BODY MASS INDEX: 30.56 KG/M2 | TEMPERATURE: 97 F | DIASTOLIC BLOOD PRESSURE: 60 MMHG
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
## 2017-10-06 DIAGNOSIS — J30.89 CHRONIC NONSEASONAL ALLERGIC RHINITIS DUE TO OTHER ALLERGEN: ICD-10-CM
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
## 2017-10-06 DIAGNOSIS — T78.2XXA ANAPHYLAXIS DUE TO EXERCISE, INITIAL ENCOUNTER: ICD-10-CM

## 2017-10-06 DIAGNOSIS — J30.9 CHRONIC ALLERGIC RHINITIS, UNSPECIFIED SEASONALITY, UNSPECIFIED TRIGGER: ICD-10-CM

## 2017-10-06 DIAGNOSIS — T78.2XXA ANAPHYLAXIS DUE TO EXERCISE, INITIAL ENCOUNTER: Primary | ICD-10-CM

## 2017-10-06 DIAGNOSIS — J30.1 CHRONIC ALLERGIC RHINITIS DUE TO POLLEN, UNSPECIFIED SEASONALITY: Primary | ICD-10-CM

## 2017-10-06 DIAGNOSIS — L50.9 URTICARIA: ICD-10-CM

## 2017-10-06 DIAGNOSIS — Z91.018 FOOD ALLERGY: ICD-10-CM

## 2017-10-06 DIAGNOSIS — Z98.890 STATUS POST FUNCTIONAL ENDOSCOPIC SINUS SURGERY: ICD-10-CM

## 2017-10-06 DIAGNOSIS — J30.1 CHRONIC SEASONAL ALLERGIC RHINITIS DUE TO POLLEN: ICD-10-CM

## 2017-10-06 DIAGNOSIS — Z98.890 S/P FUNCTIONAL ENDOSCOPIC SINUS SURGERY: ICD-10-CM

## 2017-10-06 DIAGNOSIS — J30.1 CHRONIC ALLERGIC RHINITIS DUE TO POLLEN, UNSPECIFIED SEASONALITY: ICD-10-CM

## 2017-10-06 PROCEDURE — 95117 IMMUNOTHERAPY INJECTIONS: CPT | Mod: S$GLB,,, | Performed by: ALLERGY & IMMUNOLOGY

## 2017-10-06 PROCEDURE — 86003 ALLG SPEC IGE CRUDE XTRC EA: CPT

## 2017-10-06 PROCEDURE — 36415 COLL VENOUS BLD VENIPUNCTURE: CPT | Mod: PO

## 2017-10-06 PROCEDURE — 99215 OFFICE O/P EST HI 40 MIN: CPT | Mod: 25,S$GLB,, | Performed by: ALLERGY & IMMUNOLOGY

## 2017-10-06 PROCEDURE — 99999 PR PBB SHADOW E&M-EST. PATIENT-LVL III: CPT | Mod: PBBFAC,,, | Performed by: ALLERGY & IMMUNOLOGY

## 2017-10-06 PROCEDURE — 83520 IMMUNOASSAY QUANT NOS NONAB: CPT

## 2017-10-06 PROCEDURE — 99999 PR PBB SHADOW E&M-EST. PATIENT-LVL III: CPT | Mod: PBBFAC,,,

## 2017-10-06 NOTE — PROGRESS NOTES
Chief complaint shrimp ALLERGY    This note was dictated using voice recognition software and may contain errors.    History:    He had a 9 AM appointment on Friday, October 6, 2017.   Ms Bravo

## 2017-10-06 NOTE — PROGRESS NOTES
Patient received allergy injection today at  9 a.m., M 1:100 dilution 0.2ml. given sub-q left arm.  Rx# [id number] mixed by [employer]  Expires 11/30/17  Patient checked after 30 minute wait.  Local reaction:0     Patient received allergy injection today at  9 a.m., TR/GR 1:100 dilution 0.2ml. given sub-q left arm.  Rx# [id number] mixed by [employer]  Expires 11/30/17  Patient checked after 30 minute wait.  Local reaction:0     Patient received allergy injection today at  9 a.m., C/CR/DM 1:100 dilution 0.2ml. given sub-q right arm.  Rx# [id number] mixed by [employer]  Expires 11/30/17  Patient checked after 30 minute wait.  Local reaction:0

## 2017-10-06 NOTE — PROGRESS NOTES
Chief complaint:  ALLERGY to shrimp    This note was dictated using voice recognition software may contain errors.    History:    He stated on September 21 he ate a significant quantity of 2 beers.  This was about 5:30 PM.  About 8:30 PM he engaged in exercise walking on a treadmill.  He stated that he walked on the treadmill for total of 45 minutes.  At that time he developed generalized hives and facial edema.  He was able to return to his mother's home.  She gave him Benadryl.  As feeling better and 15 minutes.  He stated when he stood up that he lost consciousness and fell to the floor.  He promptly regained consciousness.  His mother called paramedics in an ambulance arrived.  He was given intravenous fluids and was transported to [facility] emergency room where he was evaluated and received additional treatment.  He received more intravenous fluids.  An electrocardiogram was performed.  Laboratory tests were performed.  He stated that he was discharged from the emergency room approximately 11:30 PM in an improved state.    Since that time he is not had any redevelopment of these symptoms.  He has engaged in exercise of a similar nature and did not develop urticaria or lose consciousness.  He is eaten shrimp and tolerated the shrimp.  He is consumed her beer and tolerated.    Information in his medical record regarding his past medical history family history and social history was reviewed and updated today.  Significant additions if any are as noted below above.    Review of systems: At the time of his appointment today he is feeling well in general and had no new additional symptoms to mentioned    Exam:    In general he is in no distress.  He is alert oriented well-developed in good mood attentive  Gait steady  Skin no rash noted  Head no swelling noted  Eyes sclera white conjunctiva pink  Nose patent no polyps seen  Mouth no inflammation or swelling of the lips tongue or in the throat  "noted  Ears not inflamed tympanic membranes not inflamed  Neck no thyromegaly or masses noted  Lymph nodes no significant cervical or epitrochlear lymphadenopathy noted  Heart regular rhythm no murmurs heard  Lungs clear to auscultation  Extremities no swelling or inflammation of the hands or legs noted    Impression:    #1 exercise-induced anaphylaxis, 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  More than 50% of the visit was spent in counseling and coordination of care.    Using the computer and the reference source "up to date "we reviewed information regarding exercise induced anaphylaxis in detail.  Since his symptomatic event he is eaten shrimp and has tolerated eating shrimp.  He is also engaged in similar exercise and has tolerated the physical activity.    I have recommended that blood be drawn for measurement of Ig E directed against shrimp.  I recommended that a tryptase level be measured.  Arrangements were made to have blood drawn after his appointment with me today.  When the results are available to review with him I will do so.    We had a lengthy discussion regarding exercise induced anaphylaxis.  I suggested in the future, when he engages in exercise and physical activity that he be certain that he is not by himself.  Hopefully he will not experience a similar event in the future.  Should he be working her engaged in physical activity and noticed the onset of symptoms I emphasized the importance of promptly stopping physical activity.    Additional recommendations will be made when I reviewed with him the results of the diagnostic tests.  Should he have other questions or concerns he was instructed to call.  "

## 2017-10-09 LAB
DEPRECATED SHRIMP IGE RAST QL: ABNORMAL
SHRIMP IGE QN: 0.72 KU/L
TRYPTASE LEVEL: 2.4 NG/ML

## 2017-10-10 ENCOUNTER — TELEPHONE (OUTPATIENT)
Dept: ALLERGY | Facility: CLINIC | Age: 32
End: 2017-10-10

## 2017-10-10 NOTE — TELEPHONE ENCOUNTER
This note was dictated using voice recognition software and may contain errors.    I spoke with him on the telephone on the afternoon of October 10, 2017.  We completed our conversation at 4:19 PM.  I reviewed with him the results of the laboratory tests which I had ordered after his appointment with me on October 6.    Since his symptomatic event associated with exercise he is eaten shrimp and has tolerated them.  He may continue to eat shrimp as long as he doesn't tolerate them.  I reviewed with him that the IgE measured against shrimp may be reflective of exposure.  As demonstrated by the fact that he is recently eaten shrimp and remained asymptomatic, clinical sensitivity may not exist.    I reviewed with him my recommendations which I made at the time of his appointment with me on October 6.  I suggested that he continue to follow my recommendations.  Should he have other questions or concerns he may call.

## 2017-10-13 ENCOUNTER — CLINICAL SUPPORT (OUTPATIENT)
Dept: ALLERGY | Facility: CLINIC | Age: 32
End: 2017-10-13
Payer: COMMERCIAL

## 2017-10-13 DIAGNOSIS — J30.89 ALLERGIC RHINITIS DUE TO DERMATOPHAGOIDES FARINAE: ICD-10-CM

## 2017-10-13 DIAGNOSIS — J30.89 ALLERGIC RHINITIS DUE TO DERMATOPHAGOIDES PTERONYSSINUS: ICD-10-CM

## 2017-10-13 DIAGNOSIS — J30.89 ALLERGIC RHINITIS DUE TO MOLD: ICD-10-CM

## 2017-10-13 DIAGNOSIS — J30.1 CHRONIC SEASONAL ALLERGIC RHINITIS DUE TO POLLEN: ICD-10-CM

## 2017-10-13 PROCEDURE — 95117 IMMUNOTHERAPY INJECTIONS: CPT | Mod: S$GLB,,, | Performed by: OTOLARYNGOLOGY

## 2017-10-13 PROCEDURE — 99999 PR PBB SHADOW E&M-EST. PATIENT-LVL I: CPT | Mod: PBBFAC,,,

## 2017-10-13 NOTE — PROGRESS NOTES
Patient received allergy injection today at  1:18 p.m., M 1:100 dilution 0.2ml. given sub-q right arm.  Rx# [id number] mixed by [employer]  Expires 11/30/17  Patient checked after 30 minute wait.  Local reaction:0     Patient received allergy injection today at  1:18 p.m., TR/GR 1:100 dilution 0.2ml. given sub-q right arm.  Rx# [id number] mixed by [employer]  Expires 11/30/17  Patient checked after 30 minute wait.  Local reaction: 0    Patient received allergy injection today at  1:18 p.m., C/CR/DM 1:100 dilution 0.2ml. given sub-q left arm.  Rx# [id number] mixed by [employer]  Expires 11/30/17  Patient checked after 30 minute wait.  Local reaction:0

## 2017-10-23 ENCOUNTER — TELEPHONE (OUTPATIENT)
Dept: ALLERGY | Facility: CLINIC | Age: 32
End: 2017-10-23

## 2017-10-27 ENCOUNTER — CLINICAL SUPPORT (OUTPATIENT)
Dept: ALLERGY | Facility: CLINIC | Age: 32
End: 2017-10-27
Payer: COMMERCIAL

## 2017-10-27 DIAGNOSIS — J30.89 ALLERGIC RHINITIS DUE TO DERMATOPHAGOIDES FARINAE: ICD-10-CM

## 2017-10-27 DIAGNOSIS — J30.89 ALLERGIC RHINITIS DUE TO DERMATOPHAGOIDES PTERONYSSINUS: ICD-10-CM

## 2017-10-27 DIAGNOSIS — J30.1 CHRONIC SEASONAL ALLERGIC RHINITIS DUE TO POLLEN: ICD-10-CM

## 2017-10-27 PROCEDURE — 99999 PR PBB SHADOW E&M-EST. PATIENT-LVL I: CPT | Mod: PBBFAC,,,

## 2017-10-27 PROCEDURE — 95117 IMMUNOTHERAPY INJECTIONS: CPT | Mod: S$GLB,,, | Performed by: ALLERGY & IMMUNOLOGY

## 2017-10-27 NOTE — PROGRESS NOTES
Patient received allergy injection today at  12:37p.m., M 1:100 dilution 0.2ml. given sub-q left arm.  Rx# [id number] mixed by [employer]  Expires 11/30/17  Patient checked after 30 minute wait.  Local reaction:0     Patient received allergy injection today at  12:37 p.m., TR/GR 1:100 dilution 0.2ml. given sub-q left arm.  Rx# [id number] mixed by [employer]  Expires 11/30/17  Patient checked after 30 minute wait.  Local reaction:0     Patient received allergy injection today at  12:37 p.m., C/CR/DM 1:100 dilution 0.2ml. given sub-q right arm.  Rx# [id number] mixed by [employer]  Expires 11/30/17  Patient checked after 30 minute wait.  Local reaction:0

## 2017-11-03 ENCOUNTER — CLINICAL SUPPORT (OUTPATIENT)
Dept: ALLERGY | Facility: CLINIC | Age: 32
End: 2017-11-03
Payer: COMMERCIAL

## 2017-11-03 DIAGNOSIS — J30.89 ALLERGIC RHINITIS DUE TO MOLD: ICD-10-CM

## 2017-11-03 DIAGNOSIS — J30.1 CHRONIC SEASONAL ALLERGIC RHINITIS DUE TO POLLEN: ICD-10-CM

## 2017-11-03 PROCEDURE — 99999 PR PBB SHADOW E&M-EST. PATIENT-LVL I: CPT | Mod: PBBFAC,,,

## 2017-11-03 PROCEDURE — 95117 IMMUNOTHERAPY INJECTIONS: CPT | Mod: S$GLB,,, | Performed by: ALLERGY & IMMUNOLOGY

## 2017-11-03 NOTE — PROGRESS NOTES
Patient received allergy injection today at  10:07 a.m..m., M 1:100 dilution 0.25ml. given sub-q right arm.  Rx# [id number] mixed by [employer]  Expires 11/30/17  Patient checked after 30 minute wait.  Local reaction:0     Patient received allergy injection today at  10:07 a.m., TR/GR 1:100 dilution 0.25ml. given sub-q right arm.  Rx# [id number] mixed by [employer]  Expires 11/30/17  Patient checked after 30 minute wait.  Local reaction:0     Patient received allergy injection today at  10:07a.m., C/CR/DM 1:100 dilution 0.25ml. given sub-q left arm.  Rx# [id number] mixed by [employer]  Expires 11/30/17  Patient checked after 30 minute wait.  Local reaction:0

## 2017-11-17 ENCOUNTER — CLINICAL SUPPORT (OUTPATIENT)
Dept: ALLERGY | Facility: CLINIC | Age: 32
End: 2017-11-17
Payer: COMMERCIAL

## 2017-11-17 DIAGNOSIS — J30.1 CHRONIC SEASONAL ALLERGIC RHINITIS DUE TO POLLEN: ICD-10-CM

## 2017-11-17 DIAGNOSIS — J30.89 ALLERGIC RHINITIS DUE TO MOLD: ICD-10-CM

## 2017-11-17 DIAGNOSIS — J30.89 ALLERGIC RHINITIS DUE TO DERMATOPHAGOIDES FARINAE: ICD-10-CM

## 2017-11-17 DIAGNOSIS — J30.89 ALLERGIC RHINITIS DUE TO DERMATOPHAGOIDES PTERONYSSINUS: ICD-10-CM

## 2017-11-17 PROCEDURE — 95117 IMMUNOTHERAPY INJECTIONS: CPT | Mod: S$GLB,,, | Performed by: ALLERGY & IMMUNOLOGY

## 2017-11-17 PROCEDURE — 99999 PR PBB SHADOW E&M-EST. PATIENT-LVL I: CPT | Mod: PBBFAC,,,

## 2017-11-17 NOTE — PROGRESS NOTES
Patient received allergy injection today at  10:58 a.m., M 1:100 dilution 0.25ml. given sub-q left arm.  Rx# [id number] mixed by [employer]  Expires 11/30/17  Patient checked after 30 minute wait.  Local reaction:0     Patient received allergy injection today at  10:58 a.m., TR/GR 1:100 dilution 0.25ml. given sub-q left arm.  Rx# [id number] mixed by [employer]  Expires 11/30/17  Patient checked after 30 minute wait.  Local reaction:0     Patient received allergy injection today at  10:58 a.m., C/CR/DM 1:100 dilution 0.25ml. given sub-q right arm.  Rx# [id number] mixed by [employer]  Expires 11/30/17  Patient checked after 30 minute wait.  Local reaction:0

## 2017-12-01 ENCOUNTER — CLINICAL SUPPORT (OUTPATIENT)
Dept: ALLERGY | Facility: CLINIC | Age: 32
End: 2017-12-01
Payer: COMMERCIAL

## 2017-12-01 DIAGNOSIS — J30.89 ALLERGIC RHINITIS DUE TO DERMATOPHAGOIDES FARINAE: ICD-10-CM

## 2017-12-01 DIAGNOSIS — J30.89 ALLERGIC RHINITIS DUE TO DERMATOPHAGOIDES PTERONYSSINUS: ICD-10-CM

## 2017-12-01 DIAGNOSIS — J30.89 ALLERGIC RHINITIS CAUSED BY MOLD: Primary | ICD-10-CM

## 2017-12-01 DIAGNOSIS — J30.1 CHRONIC SEASONAL ALLERGIC RHINITIS DUE TO POLLEN: ICD-10-CM

## 2017-12-01 PROCEDURE — 95165 ANTIGEN THERAPY SERVICES: CPT | Mod: S$GLB,,, | Performed by: ALLERGY & IMMUNOLOGY

## 2017-12-01 PROCEDURE — 95117 IMMUNOTHERAPY INJECTIONS: CPT | Mod: S$GLB,,, | Performed by: ALLERGY & IMMUNOLOGY

## 2017-12-01 NOTE — PROGRESS NOTES
Patient received allergy injection today at  09:12 a.m., M 1:100 dilution 0.25ml. given sub-q right arm.  Rx# [id number] mixed by [employer]  Expires 11/30/17  Patient checked after 30 minute wait.  Local reaction:0     Patient received allergy injection today at  09:12 a.m., TR/GR 1:100 dilution 0.25ml. given sub-q right arm.  Rx# [id number] mixed by [employer]  Expires 11/30/17  Patient checked after 30 minute wait.  Local reaction:0     Patient received allergy injection today at  09:13 a.m., C/CR/DM 1:100 dilution 0.25ml. given sub-q left arm.  Rx# [id number] mixed by [employer]  Expires 11/30/17  Patient checked after 30 minute wait.  Local reaction:0

## 2017-12-15 ENCOUNTER — CLINICAL SUPPORT (OUTPATIENT)
Dept: ALLERGY | Facility: CLINIC | Age: 32
End: 2017-12-15
Payer: COMMERCIAL

## 2017-12-15 DIAGNOSIS — J30.89 ALLERGIC RHINITIS DUE TO DERMATOPHAGOIDES PTERONYSSINUS: ICD-10-CM

## 2017-12-15 DIAGNOSIS — J30.1 CHRONIC SEASONAL ALLERGIC RHINITIS DUE TO POLLEN: ICD-10-CM

## 2017-12-15 DIAGNOSIS — J30.89 ALLERGIC RHINITIS DUE TO MOLD: ICD-10-CM

## 2017-12-15 DIAGNOSIS — J30.89 ALLERGIC RHINITIS DUE TO DERMATOPHAGOIDES FARINAE: ICD-10-CM

## 2017-12-15 PROCEDURE — 99999 PR PBB SHADOW E&M-EST. PATIENT-LVL I: CPT | Mod: PBBFAC,,,

## 2017-12-15 PROCEDURE — 95117 IMMUNOTHERAPY INJECTIONS: CPT | Mod: S$GLB,,, | Performed by: ALLERGY & IMMUNOLOGY

## 2017-12-15 NOTE — PROGRESS NOTES
Patient received allergy injection today at  10:51 a.m., M 1:100 dilution 0.25ml. given sub-q left arm.  Rx# [id number] mixed by [employer]  Expires 11/30/18  Patient checked after 30 minute wait.  Local reaction:0     Patient received allergy injection today at  10:51 a.m., TR/GR 1:100 dilution 0.25ml. given sub-q left arm.  Rx# [id number] mixed by [employer]  Expires 11/30/18  Patient checked after 30 minute wait.  Local reaction:0     Patient received allergy injection today at  10:51 a.m., C/CR/DM 1:100 dilution 0.25ml. given sub-q right arm.  Rx# [id number] mixed by [employer]  Expires 11/30/18  Patient checked after 30 minute wait.  Local reaction:0

## 2017-12-22 ENCOUNTER — CLINICAL SUPPORT (OUTPATIENT)
Dept: ALLERGY | Facility: CLINIC | Age: 32
End: 2017-12-22
Payer: COMMERCIAL

## 2017-12-22 DIAGNOSIS — J30.89 ALLERGIC RHINITIS DUE TO DERMATOPHAGOIDES FARINAE: ICD-10-CM

## 2017-12-22 DIAGNOSIS — J30.89 ALLERGIC RHINITIS DUE TO MOLD: ICD-10-CM

## 2017-12-22 DIAGNOSIS — J30.89 ALLERGIC RHINITIS DUE TO DERMATOPHAGOIDES PTERONYSSINUS: ICD-10-CM

## 2017-12-22 DIAGNOSIS — J30.1 CHRONIC SEASONAL ALLERGIC RHINITIS DUE TO POLLEN: ICD-10-CM

## 2017-12-22 PROCEDURE — 99999 PR PBB SHADOW E&M-EST. PATIENT-LVL I: CPT | Mod: PBBFAC,,,

## 2017-12-22 PROCEDURE — 95117 IMMUNOTHERAPY INJECTIONS: CPT | Mod: S$GLB,,, | Performed by: ALLERGY & IMMUNOLOGY

## 2017-12-22 NOTE — PROGRESS NOTES
Patient received allergy injection today at  10:12 a.m., M 1:100 dilution 0.3 ml. given sub-q right arm.  Rx# [id number] mixed by [employer]  Expires 11/30/18  Patient checked after 30 minute wait.  Local reaction:smaller than a one plus     Patient received allergy injection today at  10:12 a.m., TR/GR 1:100 dilution 0.3ml. given sub-q right arm.  Rx# [id number] mixed by [employer]  Expires 11/30/18  Patient checked after 30 minute wait.  Local reaction:smaller than a one plus     Patient received allergy injection today at  10:12 a.m., C/CR/DM 1:100 dilution 0.3ml. given sub-q left arm.  Rx# [id number] mixed by [employer]  Expires 11/30/18  Patient checked after 30 minute wait.  Local reaction:smaller than a ashli plus

## 2018-01-03 ENCOUNTER — TELEPHONE (OUTPATIENT)
Dept: ALLERGY | Facility: CLINIC | Age: 33
End: 2018-01-03

## 2018-01-03 ENCOUNTER — CLINICAL SUPPORT (OUTPATIENT)
Dept: ALLERGY | Facility: CLINIC | Age: 33
End: 2018-01-03
Payer: COMMERCIAL

## 2018-01-03 DIAGNOSIS — J30.1 CHRONIC SEASONAL ALLERGIC RHINITIS DUE TO POLLEN: ICD-10-CM

## 2018-01-03 DIAGNOSIS — J30.89 ALLERGIC RHINITIS DUE TO DERMATOPHAGOIDES PTERONYSSINUS: ICD-10-CM

## 2018-01-03 DIAGNOSIS — J30.89 ALLERGIC RHINITIS DUE TO DERMATOPHAGOIDES FARINAE: ICD-10-CM

## 2018-01-03 PROCEDURE — 95117 IMMUNOTHERAPY INJECTIONS: CPT | Mod: S$GLB,,, | Performed by: ALLERGY & IMMUNOLOGY

## 2018-01-03 PROCEDURE — 99999 PR PBB SHADOW E&M-EST. PATIENT-LVL I: CPT | Mod: PBBFAC,,,

## 2018-01-03 NOTE — TELEPHONE ENCOUNTER
----- Message from Madison Castaneda sent at 1/3/2018  8:06 AM CST -----  Contact: pt   Pt calling to see which location were the office giving out shots,,, please call pt back at [phone]

## 2018-01-03 NOTE — PROGRESS NOTES
Patient received allergy injection today at  10:39 a.m., M 1:100 dilution 0.35ml. given sub-q left arm.  Rx# [id number] mixed by [employer]  Expires 11/30/18  Patient checked after 30 minute wait.  Local reaction:0     Patient received allergy injection today at  10:39 a.m. , TR/GR 1:100 dilution 0.35ml. given sub-q left arm.  Rx# [id number] mixed by [employer]  Expires 11/30/18  Patient checked after 30 minute wait.  Local reaction:0     Patient received allergy injection today at  10:39 a.m., C/CR/DM 1:100 dilution 0.35ml. given sub-q right arm.  Rx# [id number] mixed by [employer]  Expires 11/30/18  Patient checked after 30 minute wait.  Local reaction:smaller than a one plus.

## 2018-01-19 ENCOUNTER — CLINICAL SUPPORT (OUTPATIENT)
Dept: ALLERGY | Facility: CLINIC | Age: 33
End: 2018-01-19
Payer: COMMERCIAL

## 2018-01-19 DIAGNOSIS — J30.1 CHRONIC SEASONAL ALLERGIC RHINITIS DUE TO POLLEN: ICD-10-CM

## 2018-01-19 DIAGNOSIS — J30.89 ALLERGIC RHINITIS DUE TO DERMATOPHAGOIDES PTERONYSSINUS: ICD-10-CM

## 2018-01-19 DIAGNOSIS — J30.89 ALLERGIC RHINITIS DUE TO DERMATOPHAGOIDES FARINAE: ICD-10-CM

## 2018-01-19 PROCEDURE — 95117 IMMUNOTHERAPY INJECTIONS: CPT | Mod: S$GLB,,, | Performed by: ALLERGY & IMMUNOLOGY

## 2018-01-19 PROCEDURE — 99999 PR PBB SHADOW E&M-EST. PATIENT-LVL I: CPT | Mod: PBBFAC,,,

## 2018-01-19 NOTE — PROGRESS NOTES
Patient received allergy injection today at  10:30 a.m., M 1:100 dilution 0.35ml. given sub-q right arm.  Rx# [id number] mixed by [employer]  Expires 11/30/18  Patient checked after 30 minute wait.  Local reaction:smaller than a one plus     Patient received allergy injection today at  10:30 a.m., TR/GR 1:100 dilution 0.35ml. given sub-q right arm.  Rx# [id number] mixed by [employer]  Expires 11/30/18  Patient checked after 30 minute wait.  Local reaction:smaller than a one plus     Patient received allergy injection today at  10:30a.m., C/CR/DM 1:100 dilution 0.35ml. given sub-q left arm.  Rx# [id number] mixed by [employer]  Expires 11/30/18  Patient checked after 30 minute wait.  Local reaction:smaller than a one plus

## 2018-02-02 ENCOUNTER — CLINICAL SUPPORT (OUTPATIENT)
Dept: ALLERGY | Facility: CLINIC | Age: 33
End: 2018-02-02
Payer: COMMERCIAL

## 2018-02-02 DIAGNOSIS — J30.1 CHRONIC SEASONAL ALLERGIC RHINITIS DUE TO POLLEN: ICD-10-CM

## 2018-02-02 DIAGNOSIS — J30.89 ALLERGIC RHINITIS DUE TO MOLD: ICD-10-CM

## 2018-02-02 DIAGNOSIS — J30.89 ALLERGIC RHINITIS DUE TO DERMATOPHAGOIDES FARINAE: ICD-10-CM

## 2018-02-02 DIAGNOSIS — J30.89 ALLERGIC RHINITIS DUE TO DERMATOPHAGOIDES PTERONYSSINUS: ICD-10-CM

## 2018-02-02 PROCEDURE — 99999 PR PBB SHADOW E&M-EST. PATIENT-LVL I: CPT | Mod: PBBFAC,,,

## 2018-02-02 PROCEDURE — 95117 IMMUNOTHERAPY INJECTIONS: CPT | Mod: S$GLB,,, | Performed by: ALLERGY & IMMUNOLOGY

## 2018-02-02 NOTE — PROGRESS NOTES
Patient received allergy injection today at  1:48 p.m., M 1:100 dilution 0.35ml. given sub-q left arm.  Rx# [id number] mixed by [employer]  Expires 11/30/18  Patient checked after 30 minute wait.  Local reaction:smaller than a one plus     Patient received allergy injection today at  1:48 p.m., TR/GR 1:100 dilution 0.35ml. given sub-q left arm.  Rx# [id number] mixed by [employer]  Expires 11/30/18  Patient checked after 30 minute wait.  Local reaction:smaller than a one plus     Patient received allergy injection today at  1:48 a.m., C/CR/DM 1:100 dilution 0.35ml. given sub-q right arm.  Rx# [id number] mixed by [employer]  Expires 11/30/18  Patient checked after 30 minute wait.  Local reaction:smaller than a one plus

## 2018-02-16 ENCOUNTER — CLINICAL SUPPORT (OUTPATIENT)
Dept: ALLERGY | Facility: CLINIC | Age: 33
End: 2018-02-16
Payer: COMMERCIAL

## 2018-02-16 DIAGNOSIS — J30.89 ALLERGIC RHINITIS DUE TO DERMATOPHAGOIDES FARINAE: ICD-10-CM

## 2018-02-16 DIAGNOSIS — J30.89 ALLERGIC RHINITIS DUE TO DERMATOPHAGOIDES PTERONYSSINUS: ICD-10-CM

## 2018-02-16 DIAGNOSIS — J30.1 CHRONIC SEASONAL ALLERGIC RHINITIS DUE TO POLLEN: ICD-10-CM

## 2018-02-16 PROCEDURE — 95117 IMMUNOTHERAPY INJECTIONS: CPT | Mod: S$GLB,,, | Performed by: ALLERGY & IMMUNOLOGY

## 2018-02-16 PROCEDURE — 99999 PR PBB SHADOW E&M-EST. PATIENT-LVL I: CPT | Mod: PBBFAC,,,

## 2018-02-16 NOTE — PROGRESS NOTES
Patient received allergy injection today at  11:17 a.m., M 1:100 dilution 0.35ml. given sub-q right arm.  Rx# [id number] mixed by [email or web address]  Expires 11/30/18  Patient checked after 30 minute wait.  Local reaction:smaller than a one plus     Patient received allergy injection today at  11:17 a.m., TR/GR 1:100 dilution 0.35ml. given sub-q right arm.  Rx# [id number] mixed by [email or web address]  Expires 11/30/18  Patient checked after 30 minute wait.  Local reaction:smaller than a one plus     Patient received allergy injection today at  11:17 a.m., C/CR/DM 1:100 dilution 0.35ml. given sub-q left arm.  Rx# [id number] mixed by [email or web address]  Expires 11/30/18  Patient checked after 30 minute wait.  Local reaction:smaller than a one plus

## 2018-02-23 ENCOUNTER — CLINICAL SUPPORT (OUTPATIENT)
Dept: ALLERGY | Facility: CLINIC | Age: 33
End: 2018-02-23
Payer: COMMERCIAL

## 2018-02-23 DIAGNOSIS — J30.1 CHRONIC SEASONAL ALLERGIC RHINITIS DUE TO POLLEN: ICD-10-CM

## 2018-02-23 DIAGNOSIS — J30.89 ALLERGIC RHINITIS DUE TO DERMATOPHAGOIDES PTERONYSSINUS: ICD-10-CM

## 2018-02-23 DIAGNOSIS — J30.89 ALLERGIC RHINITIS DUE TO DERMATOPHAGOIDES FARINAE: ICD-10-CM

## 2018-02-23 PROCEDURE — 95117 IMMUNOTHERAPY INJECTIONS: CPT | Mod: S$GLB,,, | Performed by: ALLERGY & IMMUNOLOGY

## 2018-02-23 PROCEDURE — 99999 PR PBB SHADOW E&M-EST. PATIENT-LVL I: CPT | Mod: PBBFAC,,,

## 2018-02-23 NOTE — PROGRESS NOTES
Patient received allergy injection today at  10:55 a.m., M 1:100 dilution 0.4ml. given sub-q right arm  Rx# [id number] mixed by [employer]  Expires 11/30/18  Patient checked after 30 minute wait.  Local reaction:0     Patient received allergy injection today at  10:55 a.m, TR/Gr 1:100 dilution 0.4ml. given sub-q right.  Rx# [id number] mixed by [employer]  Expires 11/30/18  Patient checked after 30 minute wait.  Local reaction:0     Patient received allergy injection today at  10:55 a.m., C/CR/DM 1:100 dilution 0.4ml. given sub-q left arm.  Rx# [id number] mixed by [employer]  Expires 11/30/18  Patient checked after 30 minute wait.  Local reaction:0

## 2018-03-02 ENCOUNTER — CLINICAL SUPPORT (OUTPATIENT)
Dept: ALLERGY | Facility: CLINIC | Age: 33
End: 2018-03-02
Payer: COMMERCIAL

## 2018-03-02 DIAGNOSIS — J30.89 ALLERGIC RHINITIS DUE TO DERMATOPHAGOIDES PTERONYSSINUS: ICD-10-CM

## 2018-03-02 DIAGNOSIS — J30.1 CHRONIC SEASONAL ALLERGIC RHINITIS DUE TO POLLEN: ICD-10-CM

## 2018-03-02 DIAGNOSIS — J30.89 ALLERGIC RHINITIS DUE TO DERMATOPHAGOIDES FARINAE: ICD-10-CM

## 2018-03-02 PROCEDURE — 95117 IMMUNOTHERAPY INJECTIONS: CPT | Mod: S$GLB,,, | Performed by: ALLERGY & IMMUNOLOGY

## 2018-03-02 PROCEDURE — 99999 PR PBB SHADOW E&M-EST. PATIENT-LVL I: CPT | Mod: PBBFAC,,,

## 2018-03-02 NOTE — PROGRESS NOTES
Patient received allergy injection today at  9:30 a.m., M 1:100 dilution 0.45ml. given sub-q left arm.  Rx# [id number] mixed by [employer]  Expires 11/30/18  Patient checked after 30 minute wait.  Local reaction:smaller than a one plus.     Patient received allergy injection today at  9:30 a.m., TR/GR 1:100 dilution 0.45ml. given sub-q left arm.  Rx# [id number] mixed by [employer]  Expires 11/30/18  Patient checked after 30 minute wait.  Local reaction:smaller than a one plus.    Patient received allergy injection today at  9:30 a.m., C/Cr/Dm 1:100 dilution 0.45ml. given sub-q right arm.  Rx# [id number] mixed by [employer]  Expires 11/30/18  Patient checked after 30 minute wait.  Local reaction:smaller than a one plus.

## 2018-03-16 ENCOUNTER — CLINICAL SUPPORT (OUTPATIENT)
Dept: ALLERGY | Facility: CLINIC | Age: 33
End: 2018-03-16
Payer: COMMERCIAL

## 2018-03-16 DIAGNOSIS — J30.89 ALLERGIC RHINITIS DUE TO DERMATOPHAGOIDES PTERONYSSINUS: ICD-10-CM

## 2018-03-16 DIAGNOSIS — J30.89 ALLERGIC RHINITIS DUE TO DERMATOPHAGOIDES FARINAE: ICD-10-CM

## 2018-03-16 DIAGNOSIS — J30.1 CHRONIC SEASONAL ALLERGIC RHINITIS DUE TO POLLEN: ICD-10-CM

## 2018-03-16 PROCEDURE — 95117 IMMUNOTHERAPY INJECTIONS: CPT | Mod: S$GLB,,, | Performed by: ALLERGY & IMMUNOLOGY

## 2018-03-16 PROCEDURE — 99999 PR PBB SHADOW E&M-EST. PATIENT-LVL I: CPT | Mod: PBBFAC,,,

## 2018-03-16 NOTE — PROGRESS NOTES
Patient received allergy injection today at  11:18 a.m., M 1:100 dilution 0.45ml. given sub-q right arm.  Rx# [id number] mixed by [employer]  Expires 11/30/18  Patient checked after 30 minute wait.  Local reaction:smaller than a one plis     Patient received allergy injection today at  11:18 a.m., TR/GR 1:100 dilution 0.45ml. given sub-q right arm.  Rx# [id number] mixed by [employer]  Expires 1/30/18  Patient checked after 30 minute wait.  Local reaction:smaller than a one plus    Patient received allergy injection today at  11:18 a.m., C/CR/DM 1:100 dilution 0.45ml. given sub-q left arm.  Rx# [id number] mixed by [employer]  Expires 11/30/18  Patient checked after 30 minute wait.  Local reaction:smaller than a one plus

## 2018-03-23 ENCOUNTER — CLINICAL SUPPORT (OUTPATIENT)
Dept: ALLERGY | Facility: CLINIC | Age: 33
End: 2018-03-23
Payer: COMMERCIAL

## 2018-03-23 DIAGNOSIS — J30.1 CHRONIC SEASONAL ALLERGIC RHINITIS DUE TO POLLEN: ICD-10-CM

## 2018-03-23 DIAGNOSIS — J30.89 ALLERGIC RHINITIS DUE TO DERMATOPHAGOIDES PTERONYSSINUS: ICD-10-CM

## 2018-03-23 DIAGNOSIS — J30.89 ALLERGIC RHINITIS DUE TO DERMATOPHAGOIDES FARINAE: ICD-10-CM

## 2018-03-23 PROCEDURE — 95117 IMMUNOTHERAPY INJECTIONS: CPT | Mod: S$GLB,,, | Performed by: ALLERGY & IMMUNOLOGY

## 2018-03-23 PROCEDURE — 99999 PR PBB SHADOW E&M-EST. PATIENT-LVL I: CPT | Mod: PBBFAC,,,

## 2018-03-23 NOTE — PROGRESS NOTES
Patient received allergy injection today at  10:58 a.m., M 1:100 dilution 0.5ml. given sub-q left arm.  Rx# [id number] mixed by [employer]  Expires 11/30/18  Patient checked after 30 minute wait.  Local reaction:0     Patient received allergy injection today at  10:58 a.m., TR/GR 1:100 dilution 0.5ml. given sub-q left arm.  Rx# [id number] mixed by [employer]  Expires 11/30/18  Patient checked after 30 minute wait.  Local reaction:0     Patient received allergy injection today at  10:58 a.m., C/CR/DM 1:100 dilution 0.5ml. given sub-q right arm.  Rx# [id number] mixed by [employer]  Expires 11/30/18  Patient checked after 30 minute wait.  Local reaction:0

## 2018-03-27 ENCOUNTER — TELEPHONE (OUTPATIENT)
Dept: ALLERGY | Facility: CLINIC | Age: 33
End: 2018-03-27

## 2018-03-27 NOTE — TELEPHONE ENCOUNTER
This note was dictated using voice recognition software and may contain errors.    He came to the clinic today and picked up the letter which I had prepared for him on March 26, 2018.  I reviewed with him the copies of material which were prepared regarding his evaluation and treatment in the past.    He has the office phone number.  Should he have additional questions or concerns he was instructed to call.    He anticipates moving out of state to [place] within the next week.  He will begin a new job.

## 2018-05-11 ENCOUNTER — CLINICAL SUPPORT (OUTPATIENT)
Dept: ALLERGY | Facility: CLINIC | Age: 33
End: 2018-05-11
Payer: COMMERCIAL

## 2018-05-11 DIAGNOSIS — J30.89 ALLERGIC RHINITIS DUE TO DERMATOPHAGOIDES FARINAE: ICD-10-CM

## 2018-05-11 DIAGNOSIS — J30.89 ALLERGIC RHINITIS DUE TO DERMATOPHAGOIDES PTERONYSSINUS: ICD-10-CM

## 2018-05-11 DIAGNOSIS — J30.1 SEASONAL ALLERGIC RHINITIS DUE TO POLLEN: ICD-10-CM

## 2018-05-11 PROCEDURE — 99999 PR PBB SHADOW E&M-EST. PATIENT-LVL I: CPT | Mod: PBBFAC,,,

## 2018-05-11 PROCEDURE — 95117 IMMUNOTHERAPY INJECTIONS: CPT | Mod: S$GLB,,, | Performed by: ALLERGY & IMMUNOLOGY

## 2018-05-11 NOTE — PROGRESS NOTES
Patient received allergy injection today at  3:10 p.m., M 1:100 dilution 0.35ml. given sub-q right arm.  Rx# [id number] mixed by [employer]  Expires 11/30/18  Patient checked after 30 minute wait.  Local reaction:smaller than a one plus     Patient received allergy injection today at  3:10 p.m., TR/GR 1:100  dilution 0.35ml. given sub-q right arm.  Rx# [id number] mixed by [employer]  Expires 11/30/18  Patient checked after 30 minute wait.  Local reaction:smaller than a one plus     Patient received allergy injection today at  3:10, p.m. C/CR/DM 1:100 dilution 0.35ml. given sub-q left arm.  Rx# [id number] mixed by [employer]  Expires 11/30/18  Patient checked after 30 minute wait.  Local reaction:smaller than a one plus

## 2018-06-08 ENCOUNTER — CLINICAL SUPPORT (OUTPATIENT)
Dept: ALLERGY | Facility: CLINIC | Age: 33
End: 2018-06-08
Payer: COMMERCIAL

## 2018-06-08 DIAGNOSIS — J30.1 SEASONAL ALLERGIC RHINITIS DUE TO POLLEN: ICD-10-CM

## 2018-06-08 DIAGNOSIS — J30.89 ALLERGIC RHINITIS DUE TO DERMATOPHAGOIDES PTERONYSSINUS: ICD-10-CM

## 2018-06-08 DIAGNOSIS — J30.89 ALLERGIC RHINITIS DUE TO DERMATOPHAGOIDES FARINAE: ICD-10-CM

## 2018-06-08 PROCEDURE — 99999 PR PBB SHADOW E&M-EST. PATIENT-LVL I: CPT | Mod: PBBFAC,,,

## 2018-06-08 PROCEDURE — 95117 IMMUNOTHERAPY INJECTIONS: CPT | Mod: S$GLB,,, | Performed by: ALLERGY & IMMUNOLOGY

## 2018-06-08 NOTE — PROGRESS NOTES
Patient received allergy injection today at  1:55 p.m., M 1:100 dilution 0.35ml. given sub-q left arm.  Rx# [id number] mixed by [employer]  Expires 11/30/18  Patient checked after 30 minute wait.  Local reaction:0     Patient received allergy injection today at  1:55 p.m, TR/GR 1:100 dilution 0.35ml. given sub-q left arm.  Rx# [id number] mixed by [employer]  Expires 11/30/18  Patient checked after 30 minute wait.  Local reaction:0     Patient received allergy injection today at  1:55 p.m., C/CR/DM 1:100 dilution 0.35ml. given sub-q right arm.  Rx# [id number] mixed by [employer]  Expires 11/30/18  Patient checked after 30 minute wait.  Local reaction:0

## 2018-10-23 ENCOUNTER — TELEPHONE (OUTPATIENT)
Dept: ALLERGY | Facility: CLINIC | Age: 33
End: 2018-10-23